# Patient Record
Sex: MALE | Race: WHITE | URBAN - METROPOLITAN AREA
[De-identification: names, ages, dates, MRNs, and addresses within clinical notes are randomized per-mention and may not be internally consistent; named-entity substitution may affect disease eponyms.]

---

## 2024-05-17 ENCOUNTER — APPOINTMENT (OUTPATIENT)
Dept: OTHER | Facility: HOSPITAL | Age: 39
End: 2024-05-17
Attending: NURSE PRACTITIONER

## 2025-05-07 ENCOUNTER — OFFICE VISIT (OUTPATIENT)
Dept: INTERNAL MEDICINE CLINIC | Facility: CLINIC | Age: 40
End: 2025-05-07
Payer: COMMERCIAL

## 2025-05-07 VITALS
BODY MASS INDEX: 31.39 KG/M2 | HEART RATE: 77 BPM | OXYGEN SATURATION: 98 % | TEMPERATURE: 98 F | HEIGHT: 73.54 IN | WEIGHT: 242 LBS

## 2025-05-07 DIAGNOSIS — Z13.0 SCREENING FOR DEFICIENCY ANEMIA: ICD-10-CM

## 2025-05-07 DIAGNOSIS — M79.671 PAIN IN BOTH FEET: ICD-10-CM

## 2025-05-07 DIAGNOSIS — Z13.29 SCREENING FOR ENDOCRINE, METABOLIC AND IMMUNITY DISORDER: ICD-10-CM

## 2025-05-07 DIAGNOSIS — Z13.0 SCREENING FOR ENDOCRINE, METABOLIC AND IMMUNITY DISORDER: ICD-10-CM

## 2025-05-07 DIAGNOSIS — Z01.84 IMMUNITY STATUS TESTING: ICD-10-CM

## 2025-05-07 DIAGNOSIS — Z13.228 SCREENING FOR ENDOCRINE, METABOLIC AND IMMUNITY DISORDER: ICD-10-CM

## 2025-05-07 DIAGNOSIS — Z13.1 SCREENING FOR DIABETES MELLITUS: ICD-10-CM

## 2025-05-07 DIAGNOSIS — E55.9 VITAMIN D DEFICIENCY: ICD-10-CM

## 2025-05-07 DIAGNOSIS — Z11.3 SCREENING FOR STD (SEXUALLY TRANSMITTED DISEASE): ICD-10-CM

## 2025-05-07 DIAGNOSIS — M79.672 PAIN IN BOTH FEET: ICD-10-CM

## 2025-05-07 DIAGNOSIS — Z13.29 SCREENING FOR THYROID DISORDER: ICD-10-CM

## 2025-05-07 DIAGNOSIS — Z00.00 PHYSICAL EXAM, ANNUAL: Primary | ICD-10-CM

## 2025-05-07 DIAGNOSIS — Z23 NEED FOR HEPATITIS A VACCINATION: ICD-10-CM

## 2025-05-07 DIAGNOSIS — Z13.220 SCREENING FOR LIPID DISORDERS: ICD-10-CM

## 2025-05-07 RX ORDER — MELATONIN 10 MG
CAPSULE ORAL DAILY
COMMUNITY

## 2025-05-07 NOTE — PATIENT INSTRUCTIONS
VISIT SUMMARY:  Today, you came in for a routine check-up and to discuss your liver function and foot pain. We reviewed your history of hepatitis and noted that you have not had recent liver function tests or ultrasounds. You also mentioned experiencing chronic foot pain, especially in the mornings, which has not improved with different insoles or shoes. We discussed your smoking cessation, alcohol consumption, and dietary habits.    YOUR PLAN:  -WELLNESS VISIT: This was a routine wellness visit to check your overall health. We discussed your history of hepatitis, your diet, and your alcohol consumption. We also reviewed your immunization status and noted that you have not had a recent influenza vaccination. We will order laboratory tests to check your cholesterol, liver function, kidney function, hepatitis immunity, thyroid function, and screen for diabetes. Please fast before these tests. Continue regular dental visits every six months and consider getting an influenza vaccination. Keep a record of your immunizations and try to reduce your intake of fatty foods.    -HEPATITIS: Hepatitis is an inflammation of the liver. You have a history of hepatitis and past liver issues, but you are not currently experiencing symptoms. We will monitor your liver function through laboratory tests and advise you to reduce your intake of fatty foods to prevent future issues.    -FOOT PAIN: You have been experiencing chronic foot pain, especially in the mornings. This pain has not improved with different insoles or shoes. We recommend specific foot exercises to help alleviate the pain. If these exercises and orthotics do not improve your symptoms, we may refer you to a podiatrist for further evaluation.    -SMOKING CESSATION: You quit smoking in 2023 after smoking regularly since the age of 13. This is a significant achievement for your health. We encourage you to continue with your smoking cessation and not to resume  smoking.    INSTRUCTIONS:  Please fast before your laboratory tests. Continue regular dental visits every six months. Consider getting an influenza vaccination. If your foot pain does not improve with exercises and orthotics, we may refer you to a podiatrist for further evaluation.    Contains text generated by Patel

## 2025-05-07 NOTE — PROGRESS NOTES
[facility]    CHIEF COMPLAINT:   Chief Complaint   Patient presents with    [facility] Care    Physical     Establish care and physical exam, pain on R side of stomach, wants to check, lab work.             The following individual(s) verbally consented to be recorded using ambient AI listening technology and understand that they can each withdraw their consent to this listening technology at any point by asking the clinician to turn off or pause the recording:    Patient name: rEnesto Lieberman         HPI:   Ernesto Lieberman is a 39 year old male who presents for a complete physical exam.      Problem List[1]     History of Present Illness  Ernesto Lieberman is a 39 year old male who presents for a routine check-up and evaluation of liver function and foot pain.    He has a history of hepatitis and liver issues, which were previously treated and have not been problematic recently. Consuming fatty foods used to cause significant discomfort, but this has improved after treatment. He has not had any recent liver function tests or ultrasounds.    He experiences foot pain, particularly in the mornings upon waking. The pain is described as soreness that has not improved despite trying different insoles and shoes, including custom-made options. The pain is exacerbated by wearing safety shoes at work, and he has not found relief from these interventions.    He quit smoking in 2023 after starting at the age of 13 and smoking regularly until then. He consumes alcohol a couple of times a week, typically a bottle of wine and occasionally 100-150 ml of spirits. His diet is primarily home-cooked, but he admits to consuming a significant amount of fatty foods.    He experiences occasional leg pain, particularly in the mornings, but denies any swelling. He has not found relief from changing footwear or using different insoles.    He works full-time in [employer] and is learning English. Born in [place],  he has lived in [place], [place], and [facility] [place] before moving to the current location.     Occupation: [employer], full time  Dentist: every 6 months for cleanings and checkups   Driving: yes, with the seatbelt  Smoking: started 12- 2023, 1ppd  Alcohol: 1 bottle of wine a week  No other substance use.     Vaccinations:  Influenza: Out of season   COVID: Vaccinated x 4, no recent boosters   Tdap/Td: had it done about 3 years ago    Screenings  PSA: No family history of prostate cancer  Colonoscopy: -,  not indicated, No family history of Colon cancer  Diabetes screening: Last A1c value was 5.6% done 5/9/2025.    Dexa:Not on file  Lipid screening: Cholesterol: 165, done on 5/9/2025.  HDL Cholesterol: 54, done on 5/9/2025.  TriGlycerides 78, done on 5/9/2025.  LDL Cholesterol: 96, done on 5/9/2025.       Wt Readings from Last 6 Encounters:   05/07/25 242 lb (109.8 kg)     Body mass index is 31.46 kg/m².       Current Medications[2]   Past Medical History[3]   Past Surgical History[4]   Family History[5]   Social History:   Short Social Hx on File[6]       REVIEW OF SYSTEMS:   Review of Systems   Constitutional:  Negative for chills and fever.   HENT:  Negative for congestion, ear pain, hearing loss and sinus pain.    Eyes:  Negative for blurred vision, double vision and pain.   Respiratory:  Negative for cough, shortness of breath and wheezing.    Cardiovascular:  Negative for chest pain, palpitations and leg swelling.   Gastrointestinal:  Negative for abdominal pain, constipation, diarrhea, heartburn, nausea and vomiting.   Genitourinary:  Negative for frequency and urgency.   Musculoskeletal:  Positive for joint pain (foot pain). Negative for back pain.   Skin:  Negative for rash.   Neurological:  Negative for dizziness and headaches.   Psychiatric/Behavioral: Negative.        EXAM:   Pulse 77   Temp 98.1 °F (36.7 °C) (Tympanic)   Ht 6' 1.54\" (1.868 m)   Wt 242 lb (109.8 kg)   SpO2 98%   BMI 31.46 kg/m²    Body mass index is 31.46 kg/m².   Physical Exam  Vitals reviewed.   Constitutional:       General: He is not in acute distress.     Appearance: Normal appearance. He is not ill-appearing or toxic-appearing.   HENT:      Head: Normocephalic and atraumatic.      Right Ear: Tympanic membrane, ear canal and external ear normal.      Left Ear: Tympanic membrane, ear canal and external ear normal.      Mouth/Throat:      Mouth: Mucous membranes are moist.      Pharynx: Oropharynx is clear. No oropharyngeal exudate or posterior oropharyngeal erythema.   Eyes:      Extraocular Movements: Extraocular movements intact.      Conjunctiva/sclera: Conjunctivae normal.      Pupils: Pupils are equal, round, and reactive to light.   Cardiovascular:      Rate and Rhythm: Normal rate and regular rhythm.      Pulses: Normal pulses.      Heart sounds: Normal heart sounds. No murmur heard.     No friction rub. No gallop.   Pulmonary:      Effort: Pulmonary effort is normal. No respiratory distress.      Breath sounds: Normal breath sounds. No stridor. No wheezing, rhonchi or rales.   Abdominal:      General: Abdomen is flat. There is no distension.   Musculoskeletal:      Cervical back: Normal range of motion.      Right lower leg: No edema.      Left lower leg: No edema.   Lymphadenopathy:      Cervical: No cervical adenopathy.   Skin:     General: Skin is warm.      Capillary Refill: Capillary refill takes less than 2 seconds.      Coloration: Skin is not jaundiced or pale.      Findings: No bruising, erythema, lesion or rash.   Neurological:      General: No focal deficit present.      Mental Status: He is alert and oriented to person, place, and time.      Cranial Nerves: No cranial nerve deficit.      Sensory: No sensory deficit.      Motor: No weakness.   Psychiatric:         Mood and Affect: Mood normal.         Behavior: Behavior normal.         Thought Content: Thought content normal.         Judgment: Judgment normal.         Labs:   Lab Results   Component Value Date/Time    WBC 4.9 05/09/2025 09:01 AM    HGB 15.4 05/09/2025 09:01 AM    .0 05/09/2025 09:01 AM      Lab Results   Component Value Date/Time     (H) 05/09/2025 09:01 AM     05/09/2025 09:01 AM    K 4.3 05/09/2025 09:01 AM     05/09/2025 09:01 AM    CO2 25.0 05/09/2025 09:01 AM    CREATSERUM 1.14 05/09/2025 09:01 AM    CA 9.3 05/09/2025 09:01 AM    ALB 4.6 05/09/2025 09:01 AM    TP 7.0 05/09/2025 09:01 AM    ALKPHO 58 05/09/2025 09:01 AM    AST 23 05/09/2025 09:01 AM    ALT 26 05/09/2025 09:01 AM    BILT 0.8 05/09/2025 09:01 AM    TSH 1.248 05/09/2025 09:01 AM        Lab Results   Component Value Date/Time    CHOLEST 165 05/09/2025 09:01 AM    HDL 54 05/09/2025 09:01 AM    TRIG 78 05/09/2025 09:01 AM    LDL 96 05/09/2025 09:01 AM    NONHDLC 111 05/09/2025 09:01 AM       Lab Results   Component Value Date/Time    A1C 5.6 05/09/2025 09:01 AM      Vitamin D:    Lab Results   Component Value Date    VITD 31.0 05/09/2025           ASSESSMENT AND PLAN:   Ernesto Lieberman is a 39 year old male who presents for a complete physical exam.   Pt' s weight is Body mass index is 31.46 kg/m².. Recommended regular exercise.   Age appropriate screenings discussed and orders placed.  The patient indicates understanding of these issues and agrees to the plan.  Annual eye exam and Q 6 month dental exam recommended    1. Physical exam, annual  Routine wellness visit for a 40-year-old male. No acute concerns. History of hepatitis with past liver discomfort after fatty meals. No current liver dysfunction symptoms. High intake of fatty foods discussed. Immunization status reviewed; recent vaccinations noted, including COVID-19, but no recent influenza vaccination. No family history of prostate or colorectal cancer. Regular alcohol consumption, primarily wine, with occasional spirits. Smoking cessation in 2023. Regular dental care with no current issues. Chronic foot pain  discussed, with potential referral to a specialist if exercises and orthotics are ineffective.  - Order laboratory tests: cholesterol, liver function, kidney function, hepatitis immunity, thyroid function, and diabetes screening.  - Advise fasting before laboratory tests.  - Encourage regular dental visits every six months.  - Discuss influenza vaccination benefits.  - Encourage documentation of immunization records.  - Advise dietary changes to reduce fatty food intake.  - CBC With Differential With Platelet; Future  - Comp Metabolic Panel (14); Future  - Lipid Panel; Future  - Vitamin D; Future  - TSH W Reflex To Free T4; Future  - Hemoglobin A1C; Future    2. Screening for deficiency anemia  - CBC With Differential With Platelet; Future    3. Screening for endocrine, metabolic and immunity disorder  - Comp Metabolic Panel (14); Future    4. Screening for lipid disorders  - Lipid Panel; Future    5. Vitamin D deficiency  - Vitamin D; Future    6. Screening for thyroid disorder  - TSH W Reflex To Free T4; Future    7. Screening for diabetes mellitus  - Hemoglobin A1C; Future    8. Need for hepatitis A vaccination    9. Screening for STD (sexually transmitted disease)  - T Pallidum Screening [place]; Future  - HIV AG AB Combo (Consent Obtained prechecked); Future  - Urine Chlamydia/GC Amplification; Future  - Hepatitis B Surface Antibody; Future  - Hepatitis B Surface Antigen; Future  - HCV Antibody; Future    10. Immunity status testing  - Hep A AB, Total [E]; Future     11. Foot pain  Chronic foot pain, primarily in the morning. Pain described as soreness, possibly related to footwear. History of using orthotics and custom insoles without significant relief. No acute injury. Potential referral to a podiatrist if symptoms persist despite exercises and orthotics.  - Recommend specific foot exercises to alleviate pain.  - Consider referral to a podiatrist if exercises and orthotics do not improve symptoms.    12.  Smoking cessation  Smoking from age 13 until cessation in 2023. No current smoking. Benefits of smoking cessation discussed. No intention to resume smoking.  - Encourage continued smoking cessation.      Return in about 1 year (around 5/7/2026) for physical exam or earlier as needed..      Barbara Sim MD         [1] There is no problem list on file for this patient.   [2]   Current Outpatient Medications   Medication Sig Dispense Refill    Melatonin 10 MG Oral Cap Take by mouth in the morning.     [3]   Past Medical History:   Hepatitis A   [4] History reviewed. No pertinent surgical history.  [5]   Family History  Problem Relation Age of Onset    Diabetes Father 50    Other (hep a) Mother 25    No Known Problems Sister    [6]   Social History  Socioeconomic History    Marital status:    Tobacco Use    Smoking status: Former     Current packs/day: 1.00     Average packs/day: 1 pack/day for 2.1 years (2.1 ttl pk-yrs)     Types: Cigarettes     Start date: 05/2023     Quit date: 2023     Passive exposure: Never    Smokeless tobacco: Never   Vaping Use    Vaping status: Never Used   Substance and Sexual Activity    Alcohol use: Yes     Alcohol/week: 2.0 standard drinks of alcohol     Types: 2 Standard drinks or equivalent per week    Drug use: Never   Other Topics Concern    Caffeine Concern No    Exercise No    Seat Belt No    Special Diet No    Stress Concern No    Weight Concern No

## 2025-05-09 ENCOUNTER — LAB ENCOUNTER (OUTPATIENT)
Dept: LAB | Age: 40
End: 2025-05-09
Attending: STUDENT IN AN ORGANIZED HEALTH CARE EDUCATION/TRAINING PROGRAM
Payer: COMMERCIAL

## 2025-05-09 DIAGNOSIS — Z13.228 SCREENING FOR ENDOCRINE, METABOLIC AND IMMUNITY DISORDER: ICD-10-CM

## 2025-05-09 DIAGNOSIS — Z11.3 SCREENING FOR STD (SEXUALLY TRANSMITTED DISEASE): ICD-10-CM

## 2025-05-09 DIAGNOSIS — Z13.29 SCREENING FOR THYROID DISORDER: ICD-10-CM

## 2025-05-09 DIAGNOSIS — Z13.0 SCREENING FOR DEFICIENCY ANEMIA: ICD-10-CM

## 2025-05-09 DIAGNOSIS — Z13.0 SCREENING FOR ENDOCRINE, METABOLIC AND IMMUNITY DISORDER: ICD-10-CM

## 2025-05-09 DIAGNOSIS — Z01.84 IMMUNITY STATUS TESTING: ICD-10-CM

## 2025-05-09 DIAGNOSIS — Z13.1 SCREENING FOR DIABETES MELLITUS: ICD-10-CM

## 2025-05-09 DIAGNOSIS — Z00.00 PHYSICAL EXAM, ANNUAL: ICD-10-CM

## 2025-05-09 DIAGNOSIS — Z13.220 SCREENING FOR LIPID DISORDERS: ICD-10-CM

## 2025-05-09 DIAGNOSIS — E55.9 VITAMIN D DEFICIENCY: ICD-10-CM

## 2025-05-09 DIAGNOSIS — Z13.29 SCREENING FOR ENDOCRINE, METABOLIC AND IMMUNITY DISORDER: ICD-10-CM

## 2025-05-09 LAB
ALBUMIN SERPL-MCNC: 4.6 G/DL (ref 3.2–4.8)
ALBUMIN/GLOB SERPL: 1.9 {RATIO} (ref 1–2)
ALP LIVER SERPL-CCNC: 58 U/L (ref 45–117)
ALT SERPL-CCNC: 26 U/L (ref 10–49)
ANION GAP SERPL CALC-SCNC: 8 MMOL/L (ref 0–18)
AST SERPL-CCNC: 23 U/L (ref ?–34)
BASOPHILS # BLD AUTO: 0.02 X10(3) UL (ref 0–0.2)
BASOPHILS NFR BLD AUTO: 0.4 %
BILIRUB SERPL-MCNC: 0.8 MG/DL (ref 0.3–1.2)
BUN BLD-MCNC: 19 MG/DL (ref 9–23)
CALCIUM BLD-MCNC: 9.3 MG/DL (ref 8.7–10.6)
CHLORIDE SERPL-SCNC: 108 MMOL/L (ref 98–112)
CHOLEST SERPL-MCNC: 165 MG/DL (ref ?–200)
CO2 SERPL-SCNC: 25 MMOL/L (ref 21–32)
CREAT BLD-MCNC: 1.14 MG/DL (ref 0.7–1.3)
EGFRCR SERPLBLD CKD-EPI 2021: 84 ML/MIN/1.73M2 (ref 60–?)
EOSINOPHIL # BLD AUTO: 0.12 X10(3) UL (ref 0–0.7)
EOSINOPHIL NFR BLD AUTO: 2.4 %
ERYTHROCYTE [DISTWIDTH] IN BLOOD BY AUTOMATED COUNT: 12.7 %
EST. AVERAGE GLUCOSE BLD GHB EST-MCNC: 114 MG/DL (ref 68–126)
FASTING PATIENT LIPID ANSWER: YES
FASTING STATUS PATIENT QL REPORTED: YES
GLOBULIN PLAS-MCNC: 2.4 G/DL (ref 2–3.5)
GLUCOSE BLD-MCNC: 105 MG/DL (ref 70–99)
HAV AB SER QL IA: REACTIVE
HAV IGM SER QL: NONREACTIVE
HBA1C MFR BLD: 5.6 % (ref ?–5.7)
HBV SURFACE AB SER QL: NONREACTIVE
HBV SURFACE AB SERPL IA-ACNC: <3.1 MIU/ML
HBV SURFACE AG SER-ACNC: <0.1 [IU]/L
HBV SURFACE AG SERPL QL IA: NONREACTIVE
HCT VFR BLD AUTO: 46 % (ref 39–53)
HCV AB SERPL QL IA: NONREACTIVE
HDLC SERPL-MCNC: 54 MG/DL (ref 40–59)
HGB BLD-MCNC: 15.4 G/DL (ref 13–17.5)
IMM GRANULOCYTES # BLD AUTO: 0.02 X10(3) UL (ref 0–1)
IMM GRANULOCYTES NFR BLD: 0.4 %
LDLC SERPL CALC-MCNC: 96 MG/DL (ref ?–100)
LYMPHOCYTES # BLD AUTO: 1.54 X10(3) UL (ref 1–4)
LYMPHOCYTES NFR BLD AUTO: 31.4 %
MCH RBC QN AUTO: 29.3 PG (ref 26–34)
MCHC RBC AUTO-ENTMCNC: 33.5 G/DL (ref 31–37)
MCV RBC AUTO: 87.6 FL (ref 80–100)
MONOCYTES # BLD AUTO: 0.48 X10(3) UL (ref 0.1–1)
MONOCYTES NFR BLD AUTO: 9.8 %
NEUTROPHILS # BLD AUTO: 2.73 X10 (3) UL (ref 1.5–7.7)
NEUTROPHILS # BLD AUTO: 2.73 X10(3) UL (ref 1.5–7.7)
NEUTROPHILS NFR BLD AUTO: 55.6 %
NONHDLC SERPL-MCNC: 111 MG/DL (ref ?–130)
OSMOLALITY SERPL CALC.SUM OF ELEC: 295 MOSM/KG (ref 275–295)
PLATELET # BLD AUTO: 178 10(3)UL (ref 150–450)
POTASSIUM SERPL-SCNC: 4.3 MMOL/L (ref 3.5–5.1)
PROT SERPL-MCNC: 7 G/DL (ref 5.7–8.2)
RBC # BLD AUTO: 5.25 X10(6)UL (ref 4.3–5.7)
SODIUM SERPL-SCNC: 141 MMOL/L (ref 136–145)
T PALLIDUM AB SER QL IA: NONREACTIVE
TRIGL SERPL-MCNC: 78 MG/DL (ref 30–149)
TSI SER-ACNC: 1.25 UIU/ML (ref 0.55–4.78)
VIT D+METAB SERPL-MCNC: 31 NG/ML (ref 30–100)
VLDLC SERPL CALC-MCNC: 13 MG/DL (ref 0–30)
WBC # BLD AUTO: 4.9 X10(3) UL (ref 4–11)

## 2025-05-09 PROCEDURE — 87491 CHLMYD TRACH DNA AMP PROBE: CPT | Performed by: STUDENT IN AN ORGANIZED HEALTH CARE EDUCATION/TRAINING PROGRAM

## 2025-05-09 PROCEDURE — 86709 HEPATITIS A IGM ANTIBODY: CPT | Performed by: STUDENT IN AN ORGANIZED HEALTH CARE EDUCATION/TRAINING PROGRAM

## 2025-05-09 PROCEDURE — 86780 TREPONEMA PALLIDUM: CPT | Performed by: STUDENT IN AN ORGANIZED HEALTH CARE EDUCATION/TRAINING PROGRAM

## 2025-05-09 PROCEDURE — 86803 HEPATITIS C AB TEST: CPT | Performed by: STUDENT IN AN ORGANIZED HEALTH CARE EDUCATION/TRAINING PROGRAM

## 2025-05-09 PROCEDURE — 80050 GENERAL HEALTH PANEL: CPT | Performed by: STUDENT IN AN ORGANIZED HEALTH CARE EDUCATION/TRAINING PROGRAM

## 2025-05-09 PROCEDURE — 80061 LIPID PANEL: CPT | Performed by: STUDENT IN AN ORGANIZED HEALTH CARE EDUCATION/TRAINING PROGRAM

## 2025-05-09 PROCEDURE — 86706 HEP B SURFACE ANTIBODY: CPT | Performed by: STUDENT IN AN ORGANIZED HEALTH CARE EDUCATION/TRAINING PROGRAM

## 2025-05-09 PROCEDURE — 87591 N.GONORRHOEAE DNA AMP PROB: CPT | Performed by: STUDENT IN AN ORGANIZED HEALTH CARE EDUCATION/TRAINING PROGRAM

## 2025-05-09 PROCEDURE — 83036 HEMOGLOBIN GLYCOSYLATED A1C: CPT | Performed by: STUDENT IN AN ORGANIZED HEALTH CARE EDUCATION/TRAINING PROGRAM

## 2025-05-09 PROCEDURE — 86708 HEPATITIS A ANTIBODY: CPT | Performed by: STUDENT IN AN ORGANIZED HEALTH CARE EDUCATION/TRAINING PROGRAM

## 2025-05-09 PROCEDURE — 82306 VITAMIN D 25 HYDROXY: CPT | Performed by: STUDENT IN AN ORGANIZED HEALTH CARE EDUCATION/TRAINING PROGRAM

## 2025-05-09 PROCEDURE — 87389 HIV-1 AG W/HIV-1&-2 AB AG IA: CPT | Performed by: STUDENT IN AN ORGANIZED HEALTH CARE EDUCATION/TRAINING PROGRAM

## 2025-05-09 PROCEDURE — 87340 HEPATITIS B SURFACE AG IA: CPT | Performed by: STUDENT IN AN ORGANIZED HEALTH CARE EDUCATION/TRAINING PROGRAM

## 2025-05-12 LAB
C TRACH DNA SPEC QL NAA+PROBE: NEGATIVE
N GONORRHOEA DNA SPEC QL NAA+PROBE: NEGATIVE

## 2025-05-15 DIAGNOSIS — R10.11 RUQ PAIN: Primary | ICD-10-CM

## 2025-06-22 ENCOUNTER — HOSPITAL ENCOUNTER (OUTPATIENT)
Dept: ULTRASOUND IMAGING | Age: 40
End: 2025-06-22
Attending: STUDENT IN AN ORGANIZED HEALTH CARE EDUCATION/TRAINING PROGRAM
Payer: COMMERCIAL

## 2025-06-22 ENCOUNTER — HOSPITAL ENCOUNTER (OUTPATIENT)
Dept: ULTRASOUND IMAGING | Age: 40
Discharge: HOME | End: 2025-06-22
Attending: STUDENT IN AN ORGANIZED HEALTH CARE EDUCATION/TRAINING PROGRAM
Payer: COMMERCIAL

## 2025-06-22 DIAGNOSIS — R10.11 RUQ PAIN: ICD-10-CM

## 2025-06-22 PROCEDURE — 76700 US EXAM ABDOM COMPLETE: CPT | Performed by: STUDENT IN AN ORGANIZED HEALTH CARE EDUCATION/TRAINING PROGRAM
